# Patient Record
Sex: FEMALE | Race: ASIAN | NOT HISPANIC OR LATINO
[De-identification: names, ages, dates, MRNs, and addresses within clinical notes are randomized per-mention and may not be internally consistent; named-entity substitution may affect disease eponyms.]

---

## 2017-10-27 ENCOUNTER — APPOINTMENT (OUTPATIENT)
Dept: DERMATOLOGY | Facility: CLINIC | Age: 32
End: 2017-10-27

## 2020-04-25 ENCOUNTER — MESSAGE (OUTPATIENT)
Age: 35
End: 2020-04-25

## 2020-05-13 ENCOUNTER — APPOINTMENT (OUTPATIENT)
Dept: DISASTER EMERGENCY | Facility: CLINIC | Age: 35
End: 2020-05-13

## 2020-05-13 LAB
SARS-COV-2 IGG SERPL IA-ACNC: 0 INDEX
SARS-COV-2 IGG SERPL QL IA: NEGATIVE

## 2022-06-06 ENCOUNTER — RESULT REVIEW (OUTPATIENT)
Age: 37
End: 2022-06-06

## 2022-06-06 ENCOUNTER — EMERGENCY (EMERGENCY)
Facility: HOSPITAL | Age: 37
LOS: 1 days | Discharge: HOME | End: 2022-06-06
Attending: STUDENT IN AN ORGANIZED HEALTH CARE EDUCATION/TRAINING PROGRAM
Payer: COMMERCIAL

## 2022-06-06 VITALS
HEART RATE: 97 BPM | RESPIRATION RATE: 18 BRPM | SYSTOLIC BLOOD PRESSURE: 118 MMHG | HEIGHT: 60 IN | TEMPERATURE: 98 F | WEIGHT: 167.99 LBS | DIASTOLIC BLOOD PRESSURE: 82 MMHG | OXYGEN SATURATION: 98 %

## 2022-06-06 PROCEDURE — 99285 EMERGENCY DEPT VISIT HI MDM: CPT

## 2022-06-06 NOTE — ED ADULT TRIAGE NOTE - SPO2 (%)
-- DO NOT REPLY / DO NOT REPLY ALL --  -- Message is from the [facility]--    General Patient Message      Reason for Call: Patient is asking that her doctor, doctor Iris Allen give her a call back regarding her medication  Prenatal Vit-Fe Fumarate-FA (Prenatal Vitamins) 28-0.8 MG Tab. The pharmacy is not authorizing it for her. Please give her a call back    Caller Information       Type Contact Phone    09/27/2021 08:56 AM CDT Phone (Incoming) Ayala Johnson (Self) [phone] (M)          Alternative phone number: None    Turnaround time given to caller:   \"This message will be sent to [state Provider's name]. The clinical team will fulfill your request as soon as they review your message.\"     98

## 2022-06-07 VITALS
OXYGEN SATURATION: 100 % | SYSTOLIC BLOOD PRESSURE: 112 MMHG | DIASTOLIC BLOOD PRESSURE: 78 MMHG | RESPIRATION RATE: 18 BRPM | HEART RATE: 78 BPM

## 2022-06-07 LAB
ALBUMIN SERPL ELPH-MCNC: 4.4 G/DL — SIGNIFICANT CHANGE UP (ref 3.3–5)
ALP SERPL-CCNC: 80 U/L — SIGNIFICANT CHANGE UP (ref 40–120)
ALT FLD-CCNC: 18 U/L — SIGNIFICANT CHANGE UP (ref 10–45)
ANION GAP SERPL CALC-SCNC: 12 MMOL/L — SIGNIFICANT CHANGE UP (ref 5–17)
APTT BLD: 31.1 SEC — SIGNIFICANT CHANGE UP (ref 27.5–35.5)
AST SERPL-CCNC: 15 U/L — SIGNIFICANT CHANGE UP (ref 10–40)
BILIRUB SERPL-MCNC: 0.3 MG/DL — SIGNIFICANT CHANGE UP (ref 0.2–1.2)
BLD GP AB SCN SERPL QL: NEGATIVE — SIGNIFICANT CHANGE UP
BUN SERPL-MCNC: 9 MG/DL — SIGNIFICANT CHANGE UP (ref 7–23)
CALCIUM SERPL-MCNC: 9.1 MG/DL — SIGNIFICANT CHANGE UP (ref 8.4–10.5)
CHLORIDE SERPL-SCNC: 103 MMOL/L — SIGNIFICANT CHANGE UP (ref 96–108)
CO2 SERPL-SCNC: 22 MMOL/L — SIGNIFICANT CHANGE UP (ref 22–31)
CREAT SERPL-MCNC: 0.61 MG/DL — SIGNIFICANT CHANGE UP (ref 0.5–1.3)
EGFR: 117 ML/MIN/1.73M2 — SIGNIFICANT CHANGE UP
GLUCOSE SERPL-MCNC: 181 MG/DL — HIGH (ref 70–99)
HCG SERPL-ACNC: 2124 MIU/ML — HIGH
HCT VFR BLD CALC: 35.4 % — SIGNIFICANT CHANGE UP (ref 34.5–45)
HGB BLD-MCNC: 10.9 G/DL — LOW (ref 11.5–15.5)
INR BLD: 1.08 RATIO — SIGNIFICANT CHANGE UP (ref 0.88–1.16)
MCHC RBC-ENTMCNC: 28.2 PG — SIGNIFICANT CHANGE UP (ref 27–34)
MCHC RBC-ENTMCNC: 30.8 GM/DL — LOW (ref 32–36)
MCV RBC AUTO: 91.5 FL — SIGNIFICANT CHANGE UP (ref 80–100)
NRBC # BLD: 0 /100 WBCS — SIGNIFICANT CHANGE UP (ref 0–0)
PLATELET # BLD AUTO: 275 K/UL — SIGNIFICANT CHANGE UP (ref 150–400)
POTASSIUM SERPL-MCNC: 3.8 MMOL/L — SIGNIFICANT CHANGE UP (ref 3.5–5.3)
POTASSIUM SERPL-SCNC: 3.8 MMOL/L — SIGNIFICANT CHANGE UP (ref 3.5–5.3)
PROT SERPL-MCNC: 7.4 G/DL — SIGNIFICANT CHANGE UP (ref 6–8.3)
PROTHROM AB SERPL-ACNC: 12.4 SEC — SIGNIFICANT CHANGE UP (ref 10.5–13.4)
RBC # BLD: 3.87 M/UL — SIGNIFICANT CHANGE UP (ref 3.8–5.2)
RBC # FLD: 15.6 % — HIGH (ref 10.3–14.5)
RH IG SCN BLD-IMP: POSITIVE — SIGNIFICANT CHANGE UP
SODIUM SERPL-SCNC: 137 MMOL/L — SIGNIFICANT CHANGE UP (ref 135–145)
WBC # BLD: 11.29 K/UL — HIGH (ref 3.8–10.5)
WBC # FLD AUTO: 11.29 K/UL — HIGH (ref 3.8–10.5)

## 2022-06-07 PROCEDURE — 86901 BLOOD TYPING SEROLOGIC RH(D): CPT

## 2022-06-07 PROCEDURE — 93975 VASCULAR STUDY: CPT | Mod: 26

## 2022-06-07 PROCEDURE — 76817 TRANSVAGINAL US OBSTETRIC: CPT

## 2022-06-07 PROCEDURE — 76801 OB US < 14 WKS SINGLE FETUS: CPT

## 2022-06-07 PROCEDURE — 76815 OB US LIMITED FETUS(S): CPT | Mod: 26

## 2022-06-07 PROCEDURE — 76817 TRANSVAGINAL US OBSTETRIC: CPT | Mod: 26

## 2022-06-07 PROCEDURE — 99284 EMERGENCY DEPT VISIT MOD MDM: CPT | Mod: 25

## 2022-06-07 PROCEDURE — 88305 TISSUE EXAM BY PATHOLOGIST: CPT

## 2022-06-07 PROCEDURE — 85730 THROMBOPLASTIN TIME PARTIAL: CPT

## 2022-06-07 PROCEDURE — 86900 BLOOD TYPING SEROLOGIC ABO: CPT

## 2022-06-07 PROCEDURE — 86850 RBC ANTIBODY SCREEN: CPT

## 2022-06-07 PROCEDURE — 76815 OB US LIMITED FETUS(S): CPT

## 2022-06-07 PROCEDURE — 85027 COMPLETE CBC AUTOMATED: CPT

## 2022-06-07 PROCEDURE — 85610 PROTHROMBIN TIME: CPT

## 2022-06-07 PROCEDURE — 84702 CHORIONIC GONADOTROPIN TEST: CPT

## 2022-06-07 PROCEDURE — 93975 VASCULAR STUDY: CPT

## 2022-06-07 PROCEDURE — 80053 COMPREHEN METABOLIC PANEL: CPT

## 2022-06-07 PROCEDURE — 88305 TISSUE EXAM BY PATHOLOGIST: CPT | Mod: 26

## 2022-06-07 RX ORDER — ACETAMINOPHEN 500 MG
650 TABLET ORAL ONCE
Refills: 0 | Status: COMPLETED | OUTPATIENT
Start: 2022-06-07 | End: 2022-06-07

## 2022-06-07 RX ADMIN — Medication 650 MILLIGRAM(S): at 03:20

## 2022-06-07 NOTE — ED PROVIDER NOTE - PATIENT PORTAL LINK FT
You can access the FollowMyHealth Patient Portal offered by [facility] by registering at the following website: [email or web address]. By joining [facility]’s FollowMyHealth portal, you will also be able to view your health information using other applications (apps) compatible with our system.

## 2022-06-07 NOTE — ED PROVIDER NOTE - ATTENDING CONTRIBUTION TO CARE
Attending Ana: I performed a history and physical exam of the patient and discussed their management with the resident/fellow/ACP/student. I have reviewed the resident/fellow/ACP/student note and agree with the documented findings and plan of care, except as noted. I have personally performed a substantive portion of the visit including all aspects of the medical decision making. My medical decision making and observations are found above. Please refer to any progress notes for updates on clinical course.

## 2022-06-07 NOTE — CONSULT NOTE ADULT - SUBJECTIVE AND OBJECTIVE BOX
JOMAR LOVE  36y  Female [id number]    HPI: Patient is a  LMP  here with heavy vaginal bleeding for two days. She has soaked 6-8 pads daily. Denies lightheadedness, dizziness, syncope. Thinks she passed tissue at home. She didn't think she could be pregnant because she is consistently using condoms and thinks this could be related to fibroids. She denies fever, chills, SOB, n/v, dysuria.    Name of GYN Physician: [facility]     POB:  c/sx2, sabx1, mab with D&Cx1    Pgyn: Thinks she has fibroids. Denies cysts, endometriosis, STI's, Abnormal pap smears     Home meds: none    Allergies  latex (Unknown)  [place] (Angioedema)  No Known Drug Allergies    PAST MEDICAL & SURGICAL HISTORY:  as above, otherwise denies    FAMILY HISTORY:  non-contributory    Social History:  Denies smoking use, drug use, alcohol use.       Vital Signs Last 24 Hrs  T(C): 36.9 (2022 03:25), Max: 36.9 (2022 03:25)  T(F): 98.4 (2022 03:25), Max: 98.4 (2022 03:25)  HR: 78 (2022 05:49) (77 - 97)  BP: 112/78 (2022 05:49) (112/78 - 122/83)  BP(mean): 80 (2022 02:05) (80 - 80)  RR: 18 (2022 05:49) (18 - 20)  SpO2: 100% (2022 05:49) (98% - 100%)    Physical Exam:   General: sitting comfortably in bed, NAD   Back: No CVA tenderness  Abd: Soft, non-tender, non-distended.    :  Scant bleeding on pad. External labia wnl.  Bimanual exam with no cervical motion tenderness, uterus wnl, adnexa non palpable b/l.  Cervix  dilated 2 cm   Speculum Exam: No active bleeding from os. Some clots removed from os.  Physiologic discharge.    Ext: non-tender b/l, no edema     LABS:                        10.9   11.29 )-----------( 275      ( 2022 02:25 )             35.4     -    137  |  103  |  9   ----------------------------<  181<H>  3.8   |  22  |  0.61    Ca    9.1      2022 02:25    TPro  7.4  /  Alb  4.4  /  TBili  0.3  /  DBili  x   /  AST  15  /  ALT  18  /  AlkPhos  80  06-07    I&O's Detail    PT/INR - ( 2022 02:25 )   PT: 12.4 sec;   INR: 1.08 ratio         PTT - ( 2022 02:25 )  PTT:31.1 sec      RADIOLOGY & ADDITIONAL STUDIES:    < from: US Transvaginal, OB (22 @ 04:48) >    ACC: [id number] EXAM:  US OB LES THAN 14 WKS 1ST GEST                        ACC: [id number] EXAM:  US DPLX PELVIC                        ACC: [id number] EXAM:  US OB TRANSVAGINAL                          PROCEDURE DATE:  2022          INTERPRETATION:  CLINICAL INFORMATION: Dysmenorrhea. Evaluate for   pregnancy. Beta-hCG     LMP: 2022    Estimated Gestational Age by LMP: 7 weeks 6 days.    COMPARISON: Pelvic ultrasound 2009    Endovaginal pelvic sonogram only. Color and Spectral Doppler was   performed.    FINDINGS:  Uterus: 10.5 x 6 x 6.3 cm. No intrauterine pregnancy. Evidence of prior    in the lower uterine segment.  Echogenic thickened endometrium measuring 2 cm with heterogeneous fluid   within the cavity, likely represents hemorrhagic products. No evidence of   vascular flow.    Right ovary: 2.6 x 1.8 x 2 cm. Within normal limits. Normal arterial and   venous waveforms.  Left ovary: 1.8 x 2.2 x 1.5. Within normal limits. Normal arterial and   venous waveforms.    Fluid: None.    IMPRESSION:  No evidence of intra or extrauterine pregnancy.    Echogenic thickened endometrium with heterogeneous fluid within the   cavity, likely represents hemorrhagic products. No evidence of vascular   flow to confirm retained products of conception. Continual follow up   serial beta hCGs recommended.    --- End of Report ---    FOREIGN MENDEZ MD; Resident Radiologist  This document has been electronically signed.  GIORGIO IBANEZ MD; Attending Radiologist  This document has been electronically signed. 2022  5:25AM    < end of copied text >

## 2022-06-07 NOTE — ED ADULT NURSE NOTE - CAS TRG GENERAL NORM CIRC DET
Called [phone]-not a working number. Called [phone] and spoke to pts Aunt. She does not currently have a pen to take down admin number.
Strong peripheral pulses/Capillary refill less/equal to 2 seconds

## 2022-06-07 NOTE — ED ADULT NURSE REASSESSMENT NOTE - NS ED NURSE REASSESS COMMENT FT1
Pt received from RACHAEL Ryder. Pt pending US. A&Ox4, VSS, WILD, lungs clear, distal pulses intact, abdomen soft, skin intact. Side rails up for safety, call bell and personal items within reach, instructed to call for assistance, verbalizes understanding. Will continue to monitor.

## 2022-06-07 NOTE — ED PROVIDER NOTE - OBJECTIVE STATEMENT
37yo F  (2 prior , 1 D&C >12yrs ago) no other pmh presenting to ED with complaint of heavy menstrual bleeding w passage of large clots. Bleeding began 2 days ago, LMP prior to that was mid April. Not on OCPs, sexually active with one male partner, denies any recent urinary symptoms or vaginal discharge, denies prior STDs. Low abdominal pain, crampy c/w prior menses. Denies fevers/chills.

## 2022-06-07 NOTE — ED ADULT NURSE NOTE - OBJECTIVE STATEMENT
35 YO female with PMH    via walk in presenting with complaints of    Pt Axox4, gross neuro intact, PERRL mm. Lungs clear throughout bilateral, respirations even, & non-labored. S1S2 heard, pulses strong and equal bilaterally. Abdomen soft, non-tender, non-distended. Skin warm, dry, and intact. Pt placed in position of comfort. Pt educated on call bell system and provided call bell. Bed in lowest position, wheels locked, appropriate side rails raised. Pt denies needs at this time. 37 YO  female with PMH 2 c sections, via walk in presenting with complaints of  vaginal bleeding. As per patient, pt noted vaginal bleeding on the 5th, that had went away, and then pt noted lots of bleeding on the 6th at 1600, associated with large clots, stating she is soaking 3-4 pads per hour. Pt endorsing severe pain to the lower abdomen,  and lower back. Pt states that she had one episode of NBNB emesis. Pt states that her last menstrual period was the second week of April.   Pt Axox4, gross neuro intact.  respirations even, & non-labored.  pulses strong and equal bilaterally. Abdomen soft, tender, non-distended. Skin warm, dry, and intact. Pt placed in position of comfort. Pt educated on call bell system and provided call bell. Bed in lowest position, wheels locked, appropriate side rails raised. Pt denies needs at this time.

## 2022-06-07 NOTE — ED PROVIDER NOTE - PROGRESS NOTE DETAILS
Rene Santos, PGY-2: Pt reexamined at bedside, resting comfortably, vitals stable. Labs appreciated, pt underwent TVUS which shows thick endometrium, read pending. HCG+, Pt Rh+. OB consulted, will see patient. Rene Santos, PGY-2: TVUS read appreciated. Discussed with OB who has seen patient, will f/u in oupt office.

## 2022-06-07 NOTE — ED PROVIDER NOTE - CLINICAL SUMMARY MEDICAL DECISION MAKING FREE TEXT BOX
37yo F w heavy bleeding and passage of large clots for past 2-3 days, missed period last month with no OCPs, concerned may be pregnant. no prior positive HCG. Denies fever/chills, n/v, abd pain greater than normal menses. Well appearing with vitals stable, TVUS ordered. follow hcg, hgb. 35yo F w heavy bleeding and passage of large clots for past 2-3 days, missed period last month with no OCPs, concerned may be pregnant. no prior positive HCG. Denies fever/chills, n/v, abd pain greater than normal menses. Well appearing with vitals stable, TVUS ordered. follow hcg, hgb.    Attending Nello: 35 y/o F w/ no sig PMH presenting w/ vaginal bleeding. . Seen in purple, w/ . Reports vaginal bleeding for the past 2 days, increasing, passing large clots at home. Is sexually active w/ her  and LMP was in the middle of April. Endorsing lower abdominal cramping feeling as well. No fevers or chills. Pt well appearing on exam, no acute distress. Lungs clear. HR regular. Abd nondistended/soft/tender. Pelvic exam as above. Non focal neuro examm A&O x3. Pt w/ vaginal bleeding and past due for menses. Will need to eval for pregnancy vs. ectopic pregnancy. Possible spontaneous . possible fibroids. Do not suspect torsion. Plan for labs, imaging, meds, likely OB/Gyn consult. Will reassess the need for additional interventions as clinically warranted.

## 2022-06-07 NOTE — CONSULT NOTE ADULT - ASSESSMENT
35yo  LMP  (7w6d by LMP) here with heavy vaginal bleeding in the setting of early pregnancy.    -ongoing  likely however ectopic cannot be ruled out as IUP had not yet been visualized  -patient otherwise asymptomatic so she will follow up outpatient in 2 days in the office for repeat bHCG  -clots/POC sent to pathology that were removed in the ED  -patient and partner expressed understanding and agreed with the plan    D/w Dr. Janneth Rodas PGY-4

## 2022-06-07 NOTE — ED ADULT NURSE REASSESSMENT NOTE - NS ED NURSE REASSESS COMMENT FT1
pt states she feels like she may pass out. Vital signs taken and wnl. Pt placed close to triage nurses and instructed not to get up.

## 2022-06-07 NOTE — ED PROVIDER NOTE - PHYSICAL EXAMINATION
GENERAL: non-toxic appearing, alert, in NAD  HEENT: atraumatic, normocephalic, Vision grossly intact, no conjunctivitis or discharge, hearing grossly intact,  no nasal discharge, epistaxis   CARDIAC: RRR, normal S1S2,  no appreciable murmurs, no cyanosis, cap refill < 2 seconds  PULM: normal work of breathing, oxygen saturation on RA wnl, CTAB, no crackles, rales, rhonchi, or wheezing  GI: abdomen nondistended, soft, nontender, no guarding or rebound tenderness, no palpable masses  : Chaperone Nurse Ahsan- normal external vulva, Dark blood pooled in vaginal vault, no lacerations to vagina, large clots in cervical os, no adnexal tenderness or CMT   NEURO: awake and alert, follows commands, normal speech, PERRLA, EOMI, no focal motor or sensory deficits  MSK: spine appears normal, no joint swelling or erythema, ranging all extremities with no appreciable loss of ROM  EXT: no peripheral edema, calf tenderness, redness or swelling  SKIN: warm, dry, and intact, no rashes  PSYCH: appropriate mood and affect

## 2022-06-07 NOTE — ED PROVIDER NOTE - NSFOLLOWUPINSTRUCTIONS_ED_ALL_ED_FT
You were seen and evaluated today for abdominal pain and heavy menstrual bleeding. Our work-up today included blood work  the results of which have been explained to you and provided separately. Please keep a copy of these records to present to your doctor in future visits.     Follow up with your OB/GYN in 2 days for repeat blood testing    Take 500-1000mg acetaminophen (tylenol) every 6-8 hours as needed for pain  Take 400-600mg ibuprofen (advil or motrin) every 6-8 hours as needed, take with food    Please return to the Emergency Department should you experience any of the following:  - New or worsening abdominal pain  - Fever, unexplained weight loss, night sweats  - Blood in stool or in urine  - New weakness, fatigue, or fainting  - Chest pain, SOB, palpitations

## 2022-06-16 LAB — SURGICAL PATHOLOGY STUDY: SIGNIFICANT CHANGE UP

## 2024-07-31 ENCOUNTER — NON-APPOINTMENT (OUTPATIENT)
Age: 39
End: 2024-07-31